# Patient Record
Sex: MALE | Race: WHITE | NOT HISPANIC OR LATINO | URBAN - METROPOLITAN AREA
[De-identification: names, ages, dates, MRNs, and addresses within clinical notes are randomized per-mention and may not be internally consistent; named-entity substitution may affect disease eponyms.]

---

## 2018-09-04 PROBLEM — Z12.11 SCREENING COLON: Status: ACTIVE | Noted: 2018-09-04

## 2020-10-05 ENCOUNTER — TELEHEALTH PROVIDED OTHER THAN IN PATIENT'S HOME (OUTPATIENT)
Dept: URBAN - METROPOLITAN AREA TELEHEALTH 3 | Facility: TELEHEALTH | Age: 55
End: 2020-10-05
Payer: COMMERCIAL

## 2020-10-05 VITALS — WEIGHT: 255 LBS | HEIGHT: 71 IN

## 2020-10-05 DIAGNOSIS — H93.19 TINNITUS, UNSPECIFIED EAR: ICD-10-CM

## 2020-10-05 DIAGNOSIS — Z12.11 ENCOUNTER FOR SCREENING FOR MALIGNANT NEOPLASM OF COLON: ICD-10-CM

## 2020-10-05 DIAGNOSIS — R13.12 DYSPHAGIA, OROPHARYNGEAL PHASE: ICD-10-CM

## 2020-10-05 PROCEDURE — 99244 OFF/OP CNSLTJ NEW/EST MOD 40: CPT | Mod: 95 | Performed by: INTERNAL MEDICINE

## 2020-10-05 NOTE — SERVICEHPINOTES
PATIENT VERIFIED BY DATE OF BIRTH AND NAME. Patient has been consented for this telecommunication visit. This is a 57 yo male with PMhx of IDDM, HTN, GERD, obesity here for consultation regarding dysphagia and OV prior to colonoscopy.  The dysphagia is mostly to solids, ongoing for the past 7 years.  He had C4-6 surgery with plates placement 3 years back.  He's also had numerous orthopedic surgeries, including bilateral knee replacement, wrist, and shoulder.  He's had numerous endotracheal tube placements for numerous surgical procedures - and each time he's had surgical interventions, he's had more difficulty swallowing.  Points to near top of the throat.  He regurgitates solids and liquids into his nose.  He does have daily reflux, and the Prilosec OTC which helps.  No nausea or vomiting, and he has a good appetite.  He has regular BMs, with no constipation or diarrhea.  He does lots of power lifting and consumes a high protein diet.  No rectal bleeding.  No prior EGD or colonoscopy.   He's had sinus issues, cough, and intermittent "ringing of the ears" over the past few years.All 10 point review of systems have been reviewed as per HPI and otherwise negative.

## 2020-11-30 ENCOUNTER — ON CAMPUS - OUTPATIENT (OUTPATIENT)
Dept: URBAN - METROPOLITAN AREA HOSPITAL 16 | Facility: HOSPITAL | Age: 55
End: 2020-11-30
Payer: COMMERCIAL

## 2020-11-30 DIAGNOSIS — D12.0 BENIGN NEOPLASM OF CECUM: ICD-10-CM

## 2020-11-30 DIAGNOSIS — Z12.11 ENCOUNTER FOR SCREENING FOR MALIGNANT NEOPLASM OF COLON: ICD-10-CM

## 2020-11-30 PROCEDURE — 45380 COLONOSCOPY AND BIOPSY: CPT | Mod: PT | Performed by: INTERNAL MEDICINE

## 2024-02-23 ENCOUNTER — TELEHEALTH PROVIDED OTHER THAN IN PATIENT'S HOME (OUTPATIENT)
Dept: URBAN - METROPOLITAN AREA TELEHEALTH 3 | Facility: TELEHEALTH | Age: 59
End: 2024-02-23
Payer: MEDICARE

## 2024-02-23 VITALS — WEIGHT: 256 LBS | HEIGHT: 71 IN

## 2024-02-23 DIAGNOSIS — D50.9 IRON DEFICIENCY ANEMIA, UNSPECIFIED: ICD-10-CM

## 2024-02-23 DIAGNOSIS — Z86.010 PERSONAL HISTORY OF COLONIC POLYPS: ICD-10-CM

## 2024-02-23 DIAGNOSIS — R13.12 DYSPHAGIA, OROPHARYNGEAL PHASE: ICD-10-CM

## 2024-02-23 DIAGNOSIS — K59.09 OTHER CONSTIPATION: ICD-10-CM

## 2024-02-23 PROCEDURE — 99205 OFFICE O/P NEW HI 60 MIN: CPT | Mod: 95 | Performed by: INTERNAL MEDICINE

## 2024-02-23 RX ORDER — LUBIPROSTONE 24 UG/1
CAPSULE, GELATIN COATED ORAL
Qty: 60 | Refills: 4 | Status: ACTIVE
Start: 2024-02-23

## 2024-02-23 RX ORDER — OMEPRAZOLE 40 MG/1
CAPSULE, DELAYED RELEASE ORAL
Qty: 60 | Refills: 3 | Status: ACTIVE
Start: 2024-02-23

## 2024-02-23 NOTE — SERVICEHPINOTES
PATIENT VERIFIED BY DATE OF BIRTH AND NAME. Patient has been consented for this telecommunication visit.
pérez Sun is a 57 yo male with PMhx of IDDM, HTN, GERD, obesity here for consultation for various GI compaints.  The dysphagia is mostly to solids, ongoing for the past 9 years. He had C4-6 surgery with plates placement 5 years back, then had another surgical fusion with hardware from C5-C7.   He's also had numerous orthopedic surgeries, including bilateral knee replacement, wrist, and shoulder. He's had numerous endotracheal tube placements for numerous surgical procedures - and each time he's had surgical interventions, he's had more difficulty swallowing. Points to near top of the throat. He regurgitates solids and liquids into his nose. He does have daily reflux, and the Prilosec OTC which helps.pérez De Oliveira the past 6 months, he's had worsening bloating, constipation, and weight gain.  He has tried Miralax, Dulcolax, and Mag citrate, which doesn't help much.  He is trying to limit his carb intake.  His stool has been dark.  He has been receiving both oral and IV iron infusions for iron def anemia.  He is taking oxycontin and oxycodone for chronic back pain.  He started taking Ozempic last September with worsening GI complaints.   + early satiety, bloating, and taking meds for his DM II.All 10 point review of systems have been reviewed as per HPI and otherwise negative. pérez ortez

## 2024-02-23 NOTE — SERVICENOTES
Time spent was over 70 minutes. Patient's visit was conducted through [employer] video telecommunication. Patient consented before the start of visit as to understanding of privacy concerns, possible technological failure, and their responsibility of carrying out instructions of plan.

## 2024-05-21 ENCOUNTER — ON CAMPUS - OUTPATIENT (OUTPATIENT)
Dept: URBAN - METROPOLITAN AREA HOSPITAL 65 | Facility: HOSPITAL | Age: 59
End: 2024-05-21
Payer: MEDICARE

## 2024-05-21 DIAGNOSIS — R13.10 DYSPHAGIA, UNSPECIFIED: ICD-10-CM

## 2024-05-21 DIAGNOSIS — K59.09 OTHER CONSTIPATION: ICD-10-CM

## 2024-05-21 DIAGNOSIS — Z86.010 PERSONAL HISTORY OF COLONIC POLYPS: ICD-10-CM

## 2024-05-21 DIAGNOSIS — K64.9 UNSPECIFIED HEMORRHOIDS: ICD-10-CM

## 2024-05-21 DIAGNOSIS — K57.30 DIVERTICULOSIS OF LARGE INTESTINE WITHOUT PERFORATION OR ABS: ICD-10-CM

## 2024-05-21 DIAGNOSIS — K21.00 GASTRO-ESOPHAGEAL REFLUX DISEASE WITH ESOPHAGITIS, WITHOUT B: ICD-10-CM

## 2024-05-21 DIAGNOSIS — K29.50 UNSPECIFIED CHRONIC GASTRITIS WITHOUT BLEEDING: ICD-10-CM

## 2024-05-21 PROCEDURE — 43239 EGD BIOPSY SINGLE/MULTIPLE: CPT | Performed by: INTERNAL MEDICINE

## 2024-05-21 PROCEDURE — 45378 DIAGNOSTIC COLONOSCOPY: CPT | Performed by: INTERNAL MEDICINE

## 2025-05-20 ENCOUNTER — OFFICE (OUTPATIENT)
Dept: URBAN - METROPOLITAN AREA CLINIC 79 | Facility: CLINIC | Age: 60
End: 2025-05-20
Payer: COMMERCIAL

## 2025-05-20 VITALS
DIASTOLIC BLOOD PRESSURE: 73 MMHG | HEART RATE: 91 BPM | TEMPERATURE: 97.6 F | HEIGHT: 71 IN | WEIGHT: 253 LBS | SYSTOLIC BLOOD PRESSURE: 134 MMHG

## 2025-05-20 DIAGNOSIS — K42.9 UMBILICAL HERNIA WITHOUT OBSTRUCTION OR GANGRENE: ICD-10-CM

## 2025-05-20 DIAGNOSIS — E66.01 MORBID (SEVERE) OBESITY DUE TO EXCESS CALORIES: ICD-10-CM

## 2025-05-20 DIAGNOSIS — K40.90 UNILATERAL INGUINAL HERNIA, WITHOUT OBSTRUCTION OR GANGRENE,: ICD-10-CM

## 2025-05-20 DIAGNOSIS — K64.9 UNSPECIFIED HEMORRHOIDS: ICD-10-CM

## 2025-05-20 DIAGNOSIS — Z86.0101 PERSONAL HISTORY OF ADENOMATOUS AND SERRATED COLON POLYPS: ICD-10-CM

## 2025-05-20 DIAGNOSIS — K59.03 DRUG INDUCED CONSTIPATION: ICD-10-CM

## 2025-05-20 DIAGNOSIS — K62.5 HEMORRHAGE OF ANUS AND RECTUM: ICD-10-CM

## 2025-05-20 PROCEDURE — 99215 OFFICE O/P EST HI 40 MIN: CPT | Performed by: PHYSICIAN ASSISTANT

## 2025-05-20 RX ORDER — POLYETHYLENE GLYCOL (3350) 17 G/17G
POWDER, FOR SOLUTION ORAL
Qty: 600 | Refills: 4 | Status: ACTIVE
Start: 2025-05-20

## 2025-05-20 RX ORDER — HYDROCORTISONE ACETATE 25 MG/1
SUPPOSITORY RECTAL
Qty: 30 | Refills: 3 | Status: ACTIVE
Start: 2025-05-20

## 2025-05-20 RX ORDER — POLYETHYLENE GLYCOL 3350, SODIUM SULFATE ANHYDROUS, SODIUM BICARBONATE, SODIUM CHLORIDE, POTASSIUM CHLORIDE 236; 22.74; 6.74; 5.86; 2.97 G/4L; G/4L; G/4L; G/4L; G/4L
POWDER, FOR SOLUTION ORAL
Qty: 4000 | Refills: 0 | Status: ACTIVE
Start: 2025-05-20

## 2025-08-13 ENCOUNTER — TELEHEALTH PROVIDED IN PATIENT'S HOME (OUTPATIENT)
Dept: URBAN - METROPOLITAN AREA TELEHEALTH 3 | Facility: TELEHEALTH | Age: 60
End: 2025-08-13
Payer: COMMERCIAL

## 2025-08-13 VITALS — WEIGHT: 240 LBS | HEIGHT: 71 IN

## 2025-08-13 DIAGNOSIS — K86.81 EXOCRINE PANCREATIC INSUFFICIENCY: ICD-10-CM

## 2025-08-13 DIAGNOSIS — Z86.0101 PERSONAL HISTORY OF ADENOMATOUS AND SERRATED COLON POLYPS: ICD-10-CM

## 2025-08-13 DIAGNOSIS — R10.11 RIGHT UPPER QUADRANT PAIN: ICD-10-CM

## 2025-08-13 PROCEDURE — 99215 OFFICE O/P EST HI 40 MIN: CPT | Mod: 95 | Performed by: INTERNAL MEDICINE

## 2025-08-13 RX ORDER — PANCRELIPASE 20880; 78300; 78300 [USP'U]/1; [USP'U]/1; [USP'U]/1
TABLET ORAL
Qty: 240 | Refills: 3 | Status: ACTIVE
Start: 2025-08-13